# Patient Record
Sex: FEMALE | Race: WHITE | Employment: FULL TIME | URBAN - METROPOLITAN AREA
[De-identification: names, ages, dates, MRNs, and addresses within clinical notes are randomized per-mention and may not be internally consistent; named-entity substitution may affect disease eponyms.]

---

## 2018-02-15 ENCOUNTER — OFFICE VISIT (OUTPATIENT)
Dept: OBGYN CLINIC | Facility: CLINIC | Age: 43
End: 2018-02-15

## 2018-02-15 ENCOUNTER — APPOINTMENT (OUTPATIENT)
Dept: LAB | Facility: REFERENCE LAB | Age: 43
End: 2018-02-15
Attending: OBSTETRICS & GYNECOLOGY
Payer: COMMERCIAL

## 2018-02-15 VITALS
HEIGHT: 65 IN | SYSTOLIC BLOOD PRESSURE: 106 MMHG | WEIGHT: 148 LBS | DIASTOLIC BLOOD PRESSURE: 70 MMHG | BODY MASS INDEX: 24.66 KG/M2

## 2018-02-15 DIAGNOSIS — Z01.419 ENCOUNTER FOR GYNECOLOGICAL EXAMINATION WITHOUT ABNORMAL FINDING: ICD-10-CM

## 2018-02-15 DIAGNOSIS — N92.0 MENORRHAGIA WITH REGULAR CYCLE: ICD-10-CM

## 2018-02-15 DIAGNOSIS — Z01.419 ENCOUNTER FOR GYNECOLOGICAL EXAMINATION WITHOUT ABNORMAL FINDING: Primary | ICD-10-CM

## 2018-02-15 DIAGNOSIS — Z12.39 BREAST CANCER SCREENING: ICD-10-CM

## 2018-02-15 LAB
CHOLEST SERPL-MCNC: 135 MG/DL (ref 110–200)
ERYTHROCYTE [DISTWIDTH] IN BLOOD BY AUTOMATED COUNT: 14 % (ref 11–15)
HCT VFR BLD AUTO: 42.7 % (ref 35–48)
HDLC SERPL-MCNC: 50 MG/DL
HGB BLD-MCNC: 14.1 G/DL (ref 12–16)
LDLC SERPL CALC-MCNC: 68 MG/DL (ref 0–99)
MCH RBC QN AUTO: 28.8 PG (ref 27–32)
MCHC RBC AUTO-ENTMCNC: 33 G/DL (ref 32–37)
MCV RBC AUTO: 87.4 FL (ref 80–100)
NONHDLC SERPL-MCNC: 85 MG/DL
PLATELET # BLD AUTO: 255 K/UL (ref 140–400)
PMV BLD AUTO: 9.2 FL (ref 7.4–10.3)
RBC # BLD AUTO: 4.89 M/UL (ref 3.7–5.4)
TRIGL SERPL-MCNC: 85 MG/DL (ref 1–149)
TSH SERPL-ACNC: 1.83 UIU/ML (ref 0.45–5.33)
WBC # BLD AUTO: 6.6 K/UL (ref 4–11)

## 2018-02-15 PROCEDURE — 83036 HEMOGLOBIN GLYCOSYLATED A1C: CPT

## 2018-02-15 PROCEDURE — 88175 CYTOPATH C/V AUTO FLUID REDO: CPT | Performed by: OBSTETRICS & GYNECOLOGY

## 2018-02-15 PROCEDURE — 85027 COMPLETE CBC AUTOMATED: CPT

## 2018-02-15 PROCEDURE — 80061 LIPID PANEL: CPT

## 2018-02-15 PROCEDURE — 36415 COLL VENOUS BLD VENIPUNCTURE: CPT

## 2018-02-15 PROCEDURE — 99396 PREV VISIT EST AGE 40-64: CPT | Performed by: OBSTETRICS & GYNECOLOGY

## 2018-02-15 PROCEDURE — 84443 ASSAY THYROID STIM HORMONE: CPT

## 2018-02-15 PROCEDURE — 87624 HPV HI-RISK TYP POOLED RSLT: CPT | Performed by: OBSTETRICS & GYNECOLOGY

## 2018-02-15 RX ORDER — LEVONORGESTREL AND ETHINYL ESTRADIOL 0.1-0.02MG
1 KIT ORAL DAILY
Qty: 3 PACKAGE | Refills: 3 | Status: SHIPPED | OUTPATIENT
Start: 2018-02-15 | End: 2019-02-13

## 2018-02-15 NOTE — PROGRESS NOTES
GYN H&P     2/15/2018  3:44 PM    CC: Patient is here for annual and abnormal bleeding. She had a lapse in her insurance. HPI: Patient is a 38year old  for above.  For the past year she gets her period 1 x per month, but will bleed for 9 days to ELMO.       Social History Main Topics   Smoking status: Never Smoker    Smokeless tobacco: Never Used    Alcohol use Yes    Comment: soc.     Drug use: No    Sexual activity: Yes    Partners: Male     Other Topics Concern    Blood Transfusions No     Socia Tab; Take 1 tablet by mouth daily. Dispense: 3 Package;  Refill: 3      Chelsea Brennan MD

## 2018-02-16 LAB
HBA1C MFR BLD: 5.5 % (ref 4–6)
HPV I/H RISK 1 DNA SPEC QL NAA+PROBE: NEGATIVE

## 2018-02-20 ENCOUNTER — TELEPHONE (OUTPATIENT)
Dept: OBGYN CLINIC | Facility: CLINIC | Age: 43
End: 2018-02-20

## 2018-02-24 ENCOUNTER — HOSPITAL ENCOUNTER (OUTPATIENT)
Dept: MAMMOGRAPHY | Age: 43
Discharge: HOME | End: 2018-02-24
Attending: OBSTETRICS & GYNECOLOGY
Payer: COMMERCIAL

## 2018-02-24 DIAGNOSIS — Z12.39 BREAST CANCER SCREENING: ICD-10-CM

## 2018-02-24 PROCEDURE — 77063 BREAST TOMOSYNTHESIS BI: CPT | Performed by: OBSTETRICS & GYNECOLOGY

## 2018-02-24 PROCEDURE — 77067 SCR MAMMO BI INCL CAD: CPT | Performed by: OBSTETRICS & GYNECOLOGY

## 2018-03-28 ENCOUNTER — ULTRASOUND ENCOUNTER (OUTPATIENT)
Dept: OBGYN CLINIC | Facility: CLINIC | Age: 43
End: 2018-03-28

## 2018-03-28 DIAGNOSIS — N92.0 MENORRHAGIA WITH REGULAR CYCLE: ICD-10-CM

## 2018-03-28 PROCEDURE — 76856 US EXAM PELVIC COMPLETE: CPT | Performed by: OBSTETRICS & GYNECOLOGY

## 2018-03-28 PROCEDURE — 76830 TRANSVAGINAL US NON-OB: CPT | Performed by: OBSTETRICS & GYNECOLOGY

## 2018-04-17 ENCOUNTER — TELEPHONE (OUTPATIENT)
Dept: OBGYN CLINIC | Facility: CLINIC | Age: 43
End: 2018-04-17

## 2018-04-25 ENCOUNTER — TELEPHONE (OUTPATIENT)
Dept: OBGYN CLINIC | Facility: CLINIC | Age: 43
End: 2018-04-25

## 2018-04-25 NOTE — TELEPHONE ENCOUNTER
Pt calling to estephania with Dr Gable Apley only RE: Ultrasound results and abnormal bleeding since starting OCP's.

## 2018-04-25 NOTE — TELEPHONE ENCOUNTER
Called and dw pt usn c/w fibroid on uterus. She tried OCP's and had heavy bleeding after the first month of OCP's, changing her pad every hour. She took advil. DW her if her heavy bleeding returns she could restart new pack and take 2 pills for 3 days to st

## 2018-09-20 ENCOUNTER — OFFICE VISIT (OUTPATIENT)
Dept: OBGYN CLINIC | Facility: CLINIC | Age: 43
End: 2018-09-20
Payer: COMMERCIAL

## 2018-09-20 VITALS
DIASTOLIC BLOOD PRESSURE: 62 MMHG | WEIGHT: 147 LBS | BODY MASS INDEX: 24.49 KG/M2 | HEIGHT: 65 IN | SYSTOLIC BLOOD PRESSURE: 100 MMHG

## 2018-09-20 DIAGNOSIS — R10.2 PELVIC PAIN IN FEMALE: Primary | ICD-10-CM

## 2018-09-20 PROBLEM — N92.0 MENORRHAGIA WITH REGULAR CYCLE: Status: RESOLVED | Noted: 2018-02-15 | Resolved: 2018-09-20

## 2018-09-20 LAB
APPEARANCE: CLEAR
BILIRUBIN: NEGATIVE
GLUCOSE (URINE DIPSTICK): NEGATIVE MG/DL
KETONES (URINE DIPSTICK): NEGATIVE MG/DL
LEUKOCYTES: NEGATIVE
MULTISTIX EXPIRATION DATE: ABNORMAL DATE
MULTISTIX LOT#: ABNORMAL NUMERIC
NITRITE, URINE: NEGATIVE
OCCULT BLOOD: NEGATIVE
PH, URINE: 7 (ref 4.5–8)
PROTEIN (URINE DIPSTICK): NEGATIVE MG/DL
SPECIFIC GRAVITY: 1 (ref 1–1.03)
URINE-COLOR: YELLOW
UROBILINOGEN,SEMI-QN: 0.2 MG/DL (ref 0–1.9)

## 2018-09-20 PROCEDURE — 87186 SC STD MICRODIL/AGAR DIL: CPT | Performed by: OBSTETRICS & GYNECOLOGY

## 2018-09-20 PROCEDURE — 81002 URINALYSIS NONAUTO W/O SCOPE: CPT | Performed by: OBSTETRICS & GYNECOLOGY

## 2018-09-20 PROCEDURE — 87077 CULTURE AEROBIC IDENTIFY: CPT | Performed by: OBSTETRICS & GYNECOLOGY

## 2018-09-20 PROCEDURE — 87086 URINE CULTURE/COLONY COUNT: CPT | Performed by: OBSTETRICS & GYNECOLOGY

## 2018-09-20 PROCEDURE — 99214 OFFICE O/P EST MOD 30 MIN: CPT | Performed by: OBSTETRICS & GYNECOLOGY

## 2018-09-20 PROCEDURE — 90686 IIV4 VACC NO PRSV 0.5 ML IM: CPT | Performed by: OBSTETRICS & GYNECOLOGY

## 2018-09-20 PROCEDURE — 90471 IMMUNIZATION ADMIN: CPT | Performed by: OBSTETRICS & GYNECOLOGY

## 2018-09-20 RX ORDER — HYDROCODONE BITARTRATE AND ACETAMINOPHEN 7.5; 325 MG/1; MG/1
TABLET ORAL
COMMUNITY
Start: 2018-09-19 | End: 2019-02-13

## 2018-09-20 RX ORDER — AMOXICILLIN AND CLAVULANATE POTASSIUM 875; 125 MG/1; MG/1
TABLET, FILM COATED ORAL
COMMUNITY
Start: 2018-09-07 | End: 2019-02-13

## 2018-09-20 RX ORDER — ACETAMINOPHEN 500 MG
1000 TABLET ORAL
COMMUNITY
End: 2019-02-13

## 2018-09-20 NOTE — PROGRESS NOTES
CC: Patient is here for pelvic pain. HPI: Patient is a 38year old  for vaginal irritation. She had her period 2018 and noticed suprapubic tenderness with some pain with urination and external vaginal itching.  She tried uribel without relief pregnant  No date: Infertility, female  Past Surgical History:  11/2015: D & C      Comment:  [facility]  06/2013: LAPAROSCOPY, SURG, MYOMECTOMY; 1-4 INTRAMURAL MYOMAS, TOTAL    GMS, &/OR REMOVE SURFACE MYOMAS  No Known Allergies  Family History   Problem R Self-Exams: Not Asked    Social History Narrative      LIVE WITH SPOUSE      Feels safe in situation, no h/o physical or sexual abuse. Medications reviewed. See active list.     /62   Ht 65\"   Wt 147 lb   LMP 09/01/2018   Breastfeeding?  No

## 2018-09-24 ENCOUNTER — TELEPHONE (OUTPATIENT)
Dept: OBGYN CLINIC | Facility: CLINIC | Age: 43
End: 2018-09-24

## 2018-09-24 RX ORDER — NITROFURANTOIN 25; 75 MG/1; MG/1
CAPSULE ORAL
Qty: 6 CAPSULE | Refills: 0 | Status: SHIPPED | OUTPATIENT
Start: 2018-09-24 | End: 2019-02-13

## 2019-01-31 ENCOUNTER — TELEPHONE (OUTPATIENT)
Dept: OBGYN CLINIC | Facility: CLINIC | Age: 44
End: 2019-01-31

## 2019-01-31 NOTE — TELEPHONE ENCOUNTER
Spoke with pt and scheduled for annual on 2/13/19.  Pt declined 1 month refill to get her to her appmnt

## 2019-01-31 NOTE — TELEPHONE ENCOUNTER
Patient calling about REYES Tammy wants her to get prescription through mail order, [facility]. [facility] saying they are not getting a response.   The number [phone] with order #[id number]

## 2019-02-13 ENCOUNTER — OFFICE VISIT (OUTPATIENT)
Dept: OBGYN CLINIC | Facility: CLINIC | Age: 44
End: 2019-02-13
Payer: COMMERCIAL

## 2019-02-13 VITALS
HEIGHT: 65 IN | DIASTOLIC BLOOD PRESSURE: 70 MMHG | WEIGHT: 152 LBS | BODY MASS INDEX: 25.33 KG/M2 | SYSTOLIC BLOOD PRESSURE: 100 MMHG

## 2019-02-13 DIAGNOSIS — N92.0 MENORRHAGIA WITH REGULAR CYCLE: ICD-10-CM

## 2019-02-13 DIAGNOSIS — Z12.39 BREAST CANCER SCREENING: Primary | ICD-10-CM

## 2019-02-13 PROCEDURE — 99212 OFFICE O/P EST SF 10 MIN: CPT | Performed by: OBSTETRICS & GYNECOLOGY

## 2019-02-13 RX ORDER — LEVONORGESTREL AND ETHINYL ESTRADIOL 0.1-0.02MG
1 KIT ORAL DAILY
Qty: 3 PACKAGE | Refills: 3 | Status: SHIPPED | OUTPATIENT
Start: 2019-02-13 | End: 2019-04-30

## 2019-02-14 NOTE — PROGRESS NOTES
GYN H&P     2019  6:34 PM    2018 pap BK    CC: Patient is here for refill for OCP's    HPI: Patient is a 41year old  for OCP refills. Periods are not heavy or with any pain. Needs rX sent to mail in pharmacy.            Patient's last menst control/protection: OCP    Social History    Social History Narrative      LIVE WITH SPOUSE      Feels safe in situation, no h/o physical or sexual abuse. Medications reviewed.  See active list.     /70   Ht 65\"   Wt 152 lb   LMP 11/26/2018   Br

## 2019-04-23 ENCOUNTER — TELEPHONE (OUTPATIENT)
Dept: OBGYN CLINIC | Facility: CLINIC | Age: 44
End: 2019-04-23

## 2019-04-23 NOTE — TELEPHONE ENCOUNTER
Pt states she has a UTI, was given abx by an urgent care where she was given the dx. Pt states she leaves town Thursday. Pt asked if the abx given  would treat her uti.  Advised pt to follow up with her urgent care to see what the c & s results stated for a

## 2019-04-23 NOTE — TELEPHONE ENCOUNTER
Patient requesting an call back in regard to an UTI she is currently having states will like to discuss due to will be leaving for Vacation on Thursday

## 2019-04-30 ENCOUNTER — OFFICE VISIT (OUTPATIENT)
Dept: OBGYN CLINIC | Facility: CLINIC | Age: 44
End: 2019-04-30
Payer: COMMERCIAL

## 2019-04-30 VITALS — HEIGHT: 65 IN | DIASTOLIC BLOOD PRESSURE: 60 MMHG | BODY MASS INDEX: 25 KG/M2 | SYSTOLIC BLOOD PRESSURE: 100 MMHG

## 2019-04-30 DIAGNOSIS — N39.0 URINARY TRACT INFECTION WITH HEMATURIA, SITE UNSPECIFIED: Primary | ICD-10-CM

## 2019-04-30 DIAGNOSIS — R35.0 FREQUENT URINATION: ICD-10-CM

## 2019-04-30 DIAGNOSIS — R31.9 URINARY TRACT INFECTION WITH HEMATURIA, SITE UNSPECIFIED: Primary | ICD-10-CM

## 2019-04-30 PROCEDURE — 87591 N.GONORRHOEAE DNA AMP PROB: CPT | Performed by: OBSTETRICS & GYNECOLOGY

## 2019-04-30 PROCEDURE — 81002 URINALYSIS NONAUTO W/O SCOPE: CPT | Performed by: OBSTETRICS & GYNECOLOGY

## 2019-04-30 PROCEDURE — 87491 CHLMYD TRACH DNA AMP PROBE: CPT | Performed by: OBSTETRICS & GYNECOLOGY

## 2019-04-30 PROCEDURE — 87086 URINE CULTURE/COLONY COUNT: CPT | Performed by: OBSTETRICS & GYNECOLOGY

## 2019-04-30 PROCEDURE — 99213 OFFICE O/P EST LOW 20 MIN: CPT | Performed by: OBSTETRICS & GYNECOLOGY

## 2019-04-30 RX ORDER — LEVONORGESTREL AND ETHINYL ESTRADIOL 0.1-0.02MG
KIT ORAL
COMMUNITY
Start: 2019-02-13 | End: 2019-09-11

## 2019-04-30 RX ORDER — CEPHALEXIN 500 MG/1
CAPSULE ORAL
Refills: 0 | COMMUNITY
Start: 2019-04-26 | End: 2020-09-01

## 2019-04-30 RX ORDER — NITROFURANTOIN 25; 75 MG/1; MG/1
100 CAPSULE ORAL 2 TIMES DAILY
COMMUNITY
End: 2020-09-01

## 2019-04-30 NOTE — PROGRESS NOTES
CC: Patient is here for follow up for UTI. She is currently on her periods. HPI: Patient is a 41year old  for follow up for UTI. Patient [place] 9 days ago for urinary odor, burning with urination.  She was incontinence  MUSCULOSKELETAL:  No muscle pain, no back pain, no joint pain, no stiffness. HEMATOLOGIC:  No history of anemia, no excessive bleeding, no excessive bruising. LYMPHATICS:  No enlarged nodes. No history of splenectomy.   PSYCHIATRIC:  No hist resource strain: Not on file      Food insecurity:        Worry: Not on file        Inability: Not on file      Transportation needs:        Medical: Not on file        Non-medical: Not on file    Tobacco Use      Smoking status: Never Smoker      Smokeles no masses. Liver and spleen non-tender, no enlargement. No palpable hernias        A/P: Patient is 41year old female     1.  Urinary tract infection with hematuria, site unspecified  - URINALYSIS NONAUTO W/O SCOPE  - URINE CULTURE, ROUTINE; Future  - UROL

## 2019-09-06 ENCOUNTER — TELEPHONE (OUTPATIENT)
Dept: OBGYN CLINIC | Facility: CLINIC | Age: 44
End: 2019-09-06

## 2019-09-06 NOTE — TELEPHONE ENCOUNTER
Patient currently on Aviane for birth control. Patient currently does not have insurance and would like to know if doctor knows of a less expensive form of birth control.   If not she will continue with Anant but would [facility] [place]

## 2019-09-06 NOTE — TELEPHONE ENCOUNTER
Pt encouraged to call her pharmacy and ask them what the out of pocket cost is for [place] and what the cheapest OOP cost would be for any OCP.   Pt then encouraged to call us back if she would prefer to switch the brand to one that is less expensive while

## 2019-09-11 RX ORDER — NORGESTIMATE AND ETHINYL ESTRADIOL 7DAYSX3 28
1 KIT ORAL DAILY
Qty: 3 PACKAGE | Refills: 3 | Status: SHIPPED | OUTPATIENT
Start: 2019-09-11 | End: 2019-10-09 | Stop reason: WASHOUT

## 2019-09-11 NOTE — TELEPHONE ENCOUNTER
Patient talked to [facility] pharmacy and was told that Nain Rather was a lower cost birth control.

## 2020-04-01 ENCOUNTER — TELEPHONE (OUTPATIENT)
Dept: OBGYN CLINIC | Facility: CLINIC | Age: 45
End: 2020-04-01

## 2020-04-01 PROCEDURE — 99212 OFFICE O/P EST SF 10 MIN: CPT | Performed by: OBSTETRICS & GYNECOLOGY

## 2020-04-01 RX ORDER — CEPHALEXIN 500 MG/1
500 CAPSULE ORAL 2 TIMES DAILY
Qty: 14 CAPSULE | Refills: 0 | Status: SHIPPED | OUTPATIENT
Start: 2020-04-01 | End: 2020-04-08

## 2020-04-01 NOTE — TELEPHONE ENCOUNTER
C/o frequency of urination (goes to bathroom every 25 mins), bloating, has period today, HA, denies burning with urination, or foul smell, full of bladder after urination, and pain with urination.   Provided pt with telephone visit for 04/02/20 at 10:00 and

## 2020-04-01 NOTE — TELEPHONE ENCOUNTER
Virtual/Telephone Check-In    Sarah Dunham verbally consents to a Virtual/Telephone Check-In service on 04/01/20. Patient understands and accepts financial responsibility for any deductible, co-insurance and/or co-pays associated with this service.     Dur

## 2020-08-24 ENCOUNTER — TELEPHONE (OUTPATIENT)
Dept: OBGYN CLINIC | Facility: CLINIC | Age: 45
End: 2020-08-24

## 2020-08-24 RX ORDER — NORGESTIMATE AND ETHINYL ESTRADIOL 7DAYSX3 28
1 KIT ORAL DAILY
Qty: 1 PACKAGE | Refills: 0 | Status: SHIPPED | OUTPATIENT
Start: 2020-08-24 | End: 2020-09-14

## 2020-08-24 NOTE — TELEPHONE ENCOUNTER
LMTCB      Last visit 2019   Medication Quantity Refills Start End   Norgestim-Eth Estrad Triphasic (TRI-SPRINTEC) 0.18/0.215/0.25 MG-35 MCG Oral Tab () 3 Package 3 2019 10/9/2019   Sig:   Take 1 tablet by mouth daily for 28 days.      R

## 2020-09-01 ENCOUNTER — OFFICE VISIT (OUTPATIENT)
Dept: OBGYN CLINIC | Facility: CLINIC | Age: 45
End: 2020-09-01

## 2020-09-01 VITALS
DIASTOLIC BLOOD PRESSURE: 76 MMHG | HEIGHT: 65 IN | BODY MASS INDEX: 26.66 KG/M2 | SYSTOLIC BLOOD PRESSURE: 122 MMHG | WEIGHT: 160 LBS | TEMPERATURE: 98 F

## 2020-09-01 DIAGNOSIS — Z12.31 ENCOUNTER FOR SCREENING MAMMOGRAM FOR MALIGNANT NEOPLASM OF BREAST: ICD-10-CM

## 2020-09-01 DIAGNOSIS — Z01.419 ENCOUNTER FOR GYNECOLOGICAL EXAMINATION WITHOUT ABNORMAL FINDING: Primary | ICD-10-CM

## 2020-09-01 PROCEDURE — 3078F DIAST BP <80 MM HG: CPT | Performed by: OBSTETRICS & GYNECOLOGY

## 2020-09-01 PROCEDURE — 3074F SYST BP LT 130 MM HG: CPT | Performed by: OBSTETRICS & GYNECOLOGY

## 2020-09-01 PROCEDURE — 99212 OFFICE O/P EST SF 10 MIN: CPT | Performed by: OBSTETRICS & GYNECOLOGY

## 2020-09-01 PROCEDURE — 3008F BODY MASS INDEX DOCD: CPT | Performed by: OBSTETRICS & GYNECOLOGY

## 2020-09-01 NOTE — PROGRESS NOTES
GYN H&P     2020  2:04 PM    CC: Patient is here for annual. She currently has no insurance. LPS 2018    HPI: Patient is a 45year old  for annual. No further UTI's    Menses: once a month, no heavy bleeding or pain.          Patient's last men Drug use: No      Sexual activity: Yes        Partners: Male        Birth control/protection: OCP    Social History    Patient does not qualify to have social determinant information on file (likely too young).     Social History Narrative      LIVE WITH SP

## 2020-09-09 ENCOUNTER — TELEPHONE (OUTPATIENT)
Dept: OBGYN CLINIC | Facility: CLINIC | Age: 45
End: 2020-09-09

## 2020-09-09 NOTE — TELEPHONE ENCOUNTER
Pt called back, advised pt to reach out to to [facility] r/t free mammos in October for breast awareness month and pt was advised to call billing dept by central scheduling. Pt to call back if she finds a place so that order can be faxed.   Pt verbalized u

## 2020-09-09 NOTE — TELEPHONE ENCOUNTER
Left  for pt to call back r/t mammo screening. Per LC's notes from 9/1/2020:    \"I dw pt calling her board of health or local hospital in October to get screening mammogram done\".      [email or web address]

## 2020-09-09 NOTE — TELEPHONE ENCOUNTER
Wendy Resendiz is calling bc pt states Dr Alanis Krishnan says that its a free screening for mammogram and that she doesn't have to pay and the nurse is wondering about it  Please call back

## 2020-09-14 ENCOUNTER — TELEPHONE (OUTPATIENT)
Dept: OBGYN CLINIC | Facility: CLINIC | Age: 45
End: 2020-09-14

## 2020-09-14 RX ORDER — NORGESTIMATE AND ETHINYL ESTRADIOL 7DAYSX3 28
1 KIT ORAL DAILY
Qty: 3 PACKAGE | Refills: 3 | Status: SHIPPED | OUTPATIENT
Start: 2020-09-14 | End: 2021-08-23

## 2020-09-14 NOTE — TELEPHONE ENCOUNTER
Order placed and faxed. Norgestim-Eth Estrad Triphasic (TRI-SPRINTEC) 0.18/0.215/0.25 MG-35 MCG Oral Tab 3 Package 3 9/14/2020    Sig:   Take 1 tablet by mouth daily.      Route: Frankie Prima     Order #:   [id number]           Called pt and informed of refill, t

## 2021-08-23 ENCOUNTER — TELEPHONE (OUTPATIENT)
Dept: OBGYN CLINIC | Facility: CLINIC | Age: 46
End: 2021-08-23

## 2021-08-23 RX ORDER — NORGESTIMATE AND ETHINYL ESTRADIOL 7DAYSX3 28
KIT ORAL
Qty: 84 TABLET | Refills: 0 | OUTPATIENT
Start: 2021-08-23

## 2021-08-23 RX ORDER — NORGESTIMATE AND ETHINYL ESTRADIOL 7DAYSX3 28
1 KIT ORAL DAILY
Qty: 28 TABLET | Refills: 1 | Status: SHIPPED | OUTPATIENT
Start: 2021-08-23 | End: 2021-09-27

## 2021-08-23 NOTE — TELEPHONE ENCOUNTER
Patient calling because Alexandr Castillo stated that they are waiting for approval from the office. Norgestim-Eth Estrad Triphasic (TRI-SPRINTEC) 0.18/0.215/0.25 MG-35 MCG Oral Tab 3 Package 3 9/14/2020    Sig:   Take 1 tablet by mouth daily.      Route:   Oral

## 2021-08-23 NOTE — TELEPHONE ENCOUNTER
RN scheduled pt for visit with [facility] 9/27/21. LOV 9/1/20. Refill sent as requested. Norgestim-Eth Estrad Triphasic (TRI-SPRINTEC) 0.18/0.215/0.25 MG-35 MCG Oral Tab 28 tablet 1 8/23/2021     Sig - Route:  Take 1 tablet by mouth daily. - Oral

## 2021-09-27 ENCOUNTER — OFFICE VISIT (OUTPATIENT)
Dept: OBGYN CLINIC | Facility: CLINIC | Age: 46
End: 2021-09-27

## 2021-09-27 VITALS
SYSTOLIC BLOOD PRESSURE: 114 MMHG | BODY MASS INDEX: 26.66 KG/M2 | HEIGHT: 65 IN | WEIGHT: 160 LBS | DIASTOLIC BLOOD PRESSURE: 72 MMHG

## 2021-09-27 DIAGNOSIS — R53.83 FATIGUE, UNSPECIFIED TYPE: ICD-10-CM

## 2021-09-27 DIAGNOSIS — Z30.41 ENCOUNTER FOR SURVEILLANCE OF CONTRACEPTIVE PILLS: ICD-10-CM

## 2021-09-27 PROCEDURE — 3078F DIAST BP <80 MM HG: CPT | Performed by: OBSTETRICS & GYNECOLOGY

## 2021-09-27 PROCEDURE — 3074F SYST BP LT 130 MM HG: CPT | Performed by: OBSTETRICS & GYNECOLOGY

## 2021-09-27 PROCEDURE — 3008F BODY MASS INDEX DOCD: CPT | Performed by: OBSTETRICS & GYNECOLOGY

## 2021-09-27 RX ORDER — ERGOCALCIFEROL (VITAMIN D2) 10 MCG
TABLET ORAL
COMMUNITY

## 2021-09-27 RX ORDER — NORGESTIMATE AND ETHINYL ESTRADIOL 7DAYSX3 28
1 KIT ORAL DAILY
Qty: 84 TABLET | Refills: 4 | Status: SHIPPED | OUTPATIENT
Start: 2021-09-27

## 2021-09-27 NOTE — PROGRESS NOTES
GYN H&P     2021  11:09 AM    CC: Patient is here for OCP refill     HPI: Patient is a 39year old  for OCP refill. Does want exam today. She does not want to have labs done b/c she is not employed.      Periods are 1 x per month, no heavy bleed Laterality Date   • D & C  11/2015    SAB   • LAPAROSCOPY, SURG, MYOMECTOMY; 1-4 INTRAMURAL MYOMAS, TOTAL  GMS, &/OR REMOVE SURFACE MYOMAS  06/2013     No Known Allergies  Family History   Problem Relation Age of Onset   • Diabetes Mother    • No Kno